# Patient Record
Sex: MALE
[De-identification: names, ages, dates, MRNs, and addresses within clinical notes are randomized per-mention and may not be internally consistent; named-entity substitution may affect disease eponyms.]

---

## 2023-03-31 PROBLEM — Z00.00 ENCOUNTER FOR PREVENTIVE HEALTH EXAMINATION: Status: ACTIVE | Noted: 2023-03-31

## 2023-04-04 ENCOUNTER — NON-APPOINTMENT (OUTPATIENT)
Age: 38
End: 2023-04-04

## 2023-04-06 ENCOUNTER — APPOINTMENT (OUTPATIENT)
Dept: COLORECTAL SURGERY | Facility: CLINIC | Age: 38
End: 2023-04-06
Payer: COMMERCIAL

## 2023-04-06 ENCOUNTER — NON-APPOINTMENT (OUTPATIENT)
Age: 38
End: 2023-04-06

## 2023-04-06 ENCOUNTER — LABORATORY RESULT (OUTPATIENT)
Age: 38
End: 2023-04-06

## 2023-04-06 VITALS
HEIGHT: 72 IN | HEART RATE: 71 BPM | TEMPERATURE: 98.3 F | SYSTOLIC BLOOD PRESSURE: 118 MMHG | BODY MASS INDEX: 25.06 KG/M2 | DIASTOLIC BLOOD PRESSURE: 53 MMHG | WEIGHT: 185 LBS

## 2023-04-06 DIAGNOSIS — Z72.51 HIGH RISK HETEROSEXUAL BEHAVIOR: ICD-10-CM

## 2023-04-06 PROCEDURE — 46600 DIAGNOSTIC ANOSCOPY SPX: CPT

## 2023-04-06 PROCEDURE — 99203 OFFICE O/P NEW LOW 30 MIN: CPT | Mod: 25

## 2023-04-06 NOTE — PHYSICAL EXAM
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\par \par General no acute distress, alert and oriented\par Psych calm, pleasant demeanor, responding appropriately to questions\par Nonlabored breathing\par Ambulating without assistance\par Skin normal color and pigment, no visible lesions or rashes\par \par Anal pap smear performed\par \par Anorectal Exam:\par Inspection no erythema, induration or fluctuance, no skin excoriation, no fissure, minimal external hemorrhoidal tissue\par TAMRA nontender, no masses palpated, no blood on gloved finger\par \par Procedure: Anoscopy\par \par Pre procedure Diagnosis: HPV screening\par Post procedure Diagnosis: HPV screening\par Anesthesia: none\par Estimated blood loss: none\par Specimen: none\par Complications: none\par \par Consent obtained. Anoscopy was performed by passing a lighted anoscope with lubricant jelly into the anal canal and the entire anal mucosal surface was inspected. Findings included no fissure, mild internal hemorrhoids, no visible masses or lesions in anal canal\par \par Patient tolerated examination and procedure well.\par \par \par

## 2023-04-06 NOTE — HISTORY OF PRESENT ILLNESS
[FreeTextEntry1] : 38yo male presents for initial evaluation\par \par PCP Dr Xie\par Reason for visit "needs anal pap, detailed exam, MSM"\par Denies PMH or PSH\par \par MSM, HIV (-), anal receptive sex. Never had an anal pap. Has completed 2/3 Gardasil vaccines and due for final dose in Fall 2023.\par \par Pt denies anorectal complaint. Reports occasional anal itching during hot weather or if he's more physically active. Itching improved w/ showering. 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

## 2023-04-06 NOTE — ASSESSMENT
[FreeTextEntry1] : Exam findings and diagnosis were discussed at length with patient.\par I have reviewed with the patient the clinical and natural history of human papilloma virus and its relationship to the anus. The risks and associated consequences including sexual transmission, anal warts, anal dysplasia and risk of anal cancer have been outlined. \par Completing Gardasil vaccine, final dose due Fall 2023.\par Anoscopy and anal pap smear performed today.\par Further recommendations pending review of cytology.\par All questions were answered, patient expressed understanding, and is agreeable to this plan.\par \par

## 2023-05-03 LAB — ANAL PAP CYTOLOGY: NORMAL

## 2024-03-11 ENCOUNTER — APPOINTMENT (OUTPATIENT)
Dept: COLORECTAL SURGERY | Facility: CLINIC | Age: 39
End: 2024-03-11
Payer: COMMERCIAL

## 2024-03-11 ENCOUNTER — LABORATORY RESULT (OUTPATIENT)
Age: 39
End: 2024-03-11

## 2024-03-11 VITALS
TEMPERATURE: 98.3 F | RESPIRATION RATE: 16 BRPM | OXYGEN SATURATION: 97 % | WEIGHT: 185 LBS | HEIGHT: 72 IN | SYSTOLIC BLOOD PRESSURE: 129 MMHG | DIASTOLIC BLOOD PRESSURE: 69 MMHG | BODY MASS INDEX: 25.06 KG/M2 | HEART RATE: 66 BPM

## 2024-03-11 DIAGNOSIS — Z11.51 ENCOUNTER FOR SCREENING FOR HUMAN PAPILLOMAVIRUS (HPV): ICD-10-CM

## 2024-03-11 PROCEDURE — 46600 DIAGNOSTIC ANOSCOPY SPX: CPT

## 2024-03-11 NOTE — PHYSICAL EXAM
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  General no acute distress, alert and oriented Psych responding appropriately to questions Nonlabored breathing Ambulating without assistance Skin normal color and pigment, no visible lesions or rashes  Anal pap smear performed  Anorectal Exam: Inspection no cellulitis or skin changes, no fissure, small residual external hemorrhoidal tag posteriorly, no visible skin lesions TAMRA nontender, no masses palpated, no blood on gloved finger  Procedure: Anoscopy  Pre procedure Diagnosis: HPV screening Post procedure Diagnosis: HPV screening Anesthesia: none Estimated blood loss: none Specimen: none Complications: none  Consent obtained. Anoscopy was performed by passing a lighted anoscope with lubricant jelly into the anal canal and the entire anal mucosal surface was inspected. Findings included no fissure, mild internal hemorrhoids, no visible masses or lesions in anal canal  Patient tolerated examination and procedure well.

## 2024-03-11 NOTE — ASSESSMENT
[FreeTextEntry1] : Continue surveillance. Anoscopy and anal pap smear performed today. Further recommendations pending review of cytology.

## 2024-03-11 NOTE — HISTORY OF PRESENT ILLNESS
[FreeTextEntry1] : 39yo male presents for follow up  PCP Dr Xie MSM, HIV (-), (+) anal receptive sex Completed Gardasil vaccine series with PCP. 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 Anal pap 4/6/23 - ASCUS, HPV not detected.  Returns today for annual follow up/continued surveillance. Denies symptoms at present. Denies anorectal pain, itching or discharge. Denies BRBPR. BMs are daily, no diarrhea or constipation. Denies any lumps on self exam.   No changes in health since last visit. Previously on Biktarvy for PREP. Now getting injectable medication for PREP every 2 months, last received 1 week ago and STI testing was performed at that time.

## 2024-03-20 ENCOUNTER — NON-APPOINTMENT (OUTPATIENT)
Age: 39
End: 2024-03-20

## 2024-03-20 LAB — ANAL PAP CYTOLOGY: NORMAL
